# Patient Record
Sex: MALE | Race: WHITE | NOT HISPANIC OR LATINO | Employment: OTHER | URBAN - METROPOLITAN AREA
[De-identification: names, ages, dates, MRNs, and addresses within clinical notes are randomized per-mention and may not be internally consistent; named-entity substitution may affect disease eponyms.]

---

## 2020-09-21 ENCOUNTER — HOSPITAL ENCOUNTER (EMERGENCY)
Facility: HOSPITAL | Age: 35
Discharge: HOME | End: 2020-09-21
Attending: EMERGENCY MEDICINE
Payer: MEDICAID

## 2020-09-21 VITALS
DIASTOLIC BLOOD PRESSURE: 85 MMHG | TEMPERATURE: 99 F | OXYGEN SATURATION: 99 % | RESPIRATION RATE: 18 BRPM | HEART RATE: 77 BPM | SYSTOLIC BLOOD PRESSURE: 127 MMHG

## 2020-09-21 DIAGNOSIS — R07.89 CHEST PAIN, NON-CARDIAC: Primary | ICD-10-CM

## 2020-09-21 DIAGNOSIS — R51.9 HEADACHE IN FRONT OF HEAD: ICD-10-CM

## 2020-09-21 DIAGNOSIS — R07.9 CHEST PAIN: ICD-10-CM

## 2020-09-21 DIAGNOSIS — H81.399 PERIPHERAL VERTIGO, UNSPECIFIED LATERALITY: ICD-10-CM

## 2020-09-21 LAB
ALBUMIN SERPL BCP-MCNC: 4.1 G/DL (ref 3.5–5.2)
ALP SERPL-CCNC: 111 U/L (ref 55–135)
ALT SERPL W/O P-5'-P-CCNC: 20 U/L (ref 10–44)
ANION GAP SERPL CALC-SCNC: 7 MMOL/L (ref 8–16)
AST SERPL-CCNC: 14 U/L (ref 10–40)
BASOPHILS # BLD AUTO: 0.05 K/UL (ref 0–0.2)
BASOPHILS NFR BLD: 0.5 % (ref 0–1.9)
BILIRUB SERPL-MCNC: 0.4 MG/DL (ref 0.1–1)
BNP SERPL-MCNC: 11 PG/ML (ref 0–99)
BUN SERPL-MCNC: 12 MG/DL (ref 6–20)
CALCIUM SERPL-MCNC: 9.6 MG/DL (ref 8.7–10.5)
CHLORIDE SERPL-SCNC: 104 MMOL/L (ref 95–110)
CO2 SERPL-SCNC: 28 MMOL/L (ref 23–29)
CREAT SERPL-MCNC: 0.8 MG/DL (ref 0.5–1.4)
DIFFERENTIAL METHOD: ABNORMAL
EOSINOPHIL # BLD AUTO: 0.6 K/UL (ref 0–0.5)
EOSINOPHIL NFR BLD: 5.5 % (ref 0–8)
ERYTHROCYTE [DISTWIDTH] IN BLOOD BY AUTOMATED COUNT: 13.4 % (ref 11.5–14.5)
EST. GFR  (AFRICAN AMERICAN): >60 ML/MIN/1.73 M^2
EST. GFR  (NON AFRICAN AMERICAN): >60 ML/MIN/1.73 M^2
GLUCOSE SERPL-MCNC: 96 MG/DL (ref 70–110)
HCT VFR BLD AUTO: 47.2 % (ref 40–54)
HGB BLD-MCNC: 15.7 G/DL (ref 14–18)
IMM GRANULOCYTES # BLD AUTO: 0.04 K/UL (ref 0–0.04)
IMM GRANULOCYTES NFR BLD AUTO: 0.4 % (ref 0–0.5)
LYMPHOCYTES # BLD AUTO: 2.8 K/UL (ref 1–4.8)
LYMPHOCYTES NFR BLD: 25.4 % (ref 18–48)
MAGNESIUM SERPL-MCNC: 2 MG/DL (ref 1.6–2.6)
MCH RBC QN AUTO: 27.9 PG (ref 27–31)
MCHC RBC AUTO-ENTMCNC: 33.3 G/DL (ref 32–36)
MCV RBC AUTO: 84 FL (ref 82–98)
MONOCYTES # BLD AUTO: 0.8 K/UL (ref 0.3–1)
MONOCYTES NFR BLD: 6.9 % (ref 4–15)
NEUTROPHILS # BLD AUTO: 6.7 K/UL (ref 1.8–7.7)
NEUTROPHILS NFR BLD: 61.3 % (ref 38–73)
NRBC BLD-RTO: 0 /100 WBC
PLATELET # BLD AUTO: 233 K/UL (ref 150–350)
PMV BLD AUTO: 10.7 FL (ref 9.2–12.9)
POTASSIUM SERPL-SCNC: 4.4 MMOL/L (ref 3.5–5.1)
PROT SERPL-MCNC: 7.4 G/DL (ref 6–8.4)
RBC # BLD AUTO: 5.62 M/UL (ref 4.6–6.2)
SODIUM SERPL-SCNC: 139 MMOL/L (ref 136–145)
TROPONIN I SERPL DL<=0.01 NG/ML-MCNC: <0.006 NG/ML (ref 0–0.03)
WBC # BLD AUTO: 10.85 K/UL (ref 3.9–12.7)

## 2020-09-21 PROCEDURE — 83735 ASSAY OF MAGNESIUM: CPT

## 2020-09-21 PROCEDURE — 83880 ASSAY OF NATRIURETIC PEPTIDE: CPT

## 2020-09-21 PROCEDURE — 99285 EMERGENCY DEPT VISIT HI MDM: CPT | Mod: 25

## 2020-09-21 PROCEDURE — 80053 COMPREHEN METABOLIC PANEL: CPT

## 2020-09-21 PROCEDURE — 84484 ASSAY OF TROPONIN QUANT: CPT

## 2020-09-21 PROCEDURE — 93010 EKG 12-LEAD: ICD-10-PCS | Mod: ,,, | Performed by: INTERNAL MEDICINE

## 2020-09-21 PROCEDURE — 93010 ELECTROCARDIOGRAM REPORT: CPT | Mod: ,,, | Performed by: INTERNAL MEDICINE

## 2020-09-21 PROCEDURE — 93005 ELECTROCARDIOGRAM TRACING: CPT

## 2020-09-21 PROCEDURE — 85025 COMPLETE CBC W/AUTO DIFF WBC: CPT

## 2020-09-21 RX ORDER — BUTALBITAL, ACETAMINOPHEN AND CAFFEINE 50; 325; 40 MG/1; MG/1; MG/1
1 TABLET ORAL EVERY 4 HOURS PRN
Qty: 20 TABLET | Refills: 0 | Status: SHIPPED | OUTPATIENT
Start: 2020-09-21 | End: 2020-10-21

## 2020-09-21 RX ORDER — PREDNISONE 20 MG/1
40 TABLET ORAL DAILY
Qty: 10 TABLET | Refills: 0 | Status: SHIPPED | OUTPATIENT
Start: 2020-09-21 | End: 2020-09-26

## 2020-09-21 RX ORDER — MECLIZINE HYDROCHLORIDE 25 MG/1
25 TABLET ORAL EVERY 6 HOURS PRN
Qty: 20 TABLET | Refills: 0 | Status: SHIPPED | OUTPATIENT
Start: 2020-09-21

## 2020-09-21 NOTE — ED TRIAGE NOTES
Pt gives wife permission to speak on his behalf. Pt awake and alert with no acute resp distress. Here for eval of generalized weakness, chest pain, and sob x 2 mths. Primary provider diagnosed with high potassium, pain comes and goes with dizziness when trying to move around. Left sided chest pain onset last night with tingling in left side of chest. Feels like has a high temperature but its normal. Equal and strong hazel hand , no facial droop or slurred speech. Easy rr/nonlabored.

## 2020-09-21 NOTE — DISCHARGE INSTRUCTIONS
Please return immediately if you get worse or if new problems develop.  Please follow-up with your primary care doctor this week.  Rest.  Medicines as directed.  Continue your vitamins.  Regular meals; lots of sleep; lots of liquids.

## 2020-09-21 NOTE — ED PROVIDER NOTES
"Encounter Date: 9/21/2020    SCRIBE #1 NOTE: I, Sophie Graham, am scribing for, and in the presence of,  Jean Sprague MD. I have scribed the following portions of the note - Other sections scribed: HPI, VICTORIA.       History     Chief Complaint   Patient presents with    Chest Pain     Pt c/o intermittent LEFT-sided chest pain that radiates down L arm since July. Episodes come with dizziness and tingling to fingertips. Pt states PCP told him he has "high potassium". Pain is 8/10    Dizziness     This is a 35 y.o. male who presents to the ED complaining of left-sided chest pain that started 2 weeks ago. Patient's wife speaks on his behalf. She reports that the pain has progressively worsened and is worse with movement and palpation. She adds that he has pain with deep breathing but he is getting enough air. He denies having any pain on the right side of his chest. She states that he's had intermittent dizziness since July that is worse with movement. She notes that when he moves his head it feels like the room is spinning. She reports that he saw his PCP (Dr. Veronica Finley) for this and was told that he has "low/high potassium" which he is now on medication for. He denies having any hearing problems or tinnitus. She notes that he's had generalized weakness and a headache all over. She states that the headache has been intermittent for a couple months and he also gets blurry vision with it. She notes that he doesn't have a fever but he complains of feeling hot. She reports that he has intermittent right anterior knee pain and he denies having the pain currently. She denies a PMHx of DVT, PE, and heart problems. She denies any past surgeries. She notes tobacco use and denies alcohol use. Denies fever, chills, diaphoresis, ear pain, sore throat, cough, SOB, abdominal pain, vomiting, diarrhea, dysuria, rash, and arm problems. No other associated symptoms.    The history is provided by the spouse and the patient. No "  was used.     Review of patient's allergies indicates:  No Known Allergies  History reviewed. No pertinent past medical history.  History reviewed. No pertinent surgical history.  No family history on file.  Social History     Tobacco Use    Smoking status: Current Every Day Smoker     Packs/day: 2.00     Types: Cigarettes    Smokeless tobacco: Never Used   Substance Use Topics    Alcohol use: Never     Frequency: Never    Drug use: Never     Review of Systems   Constitutional: Negative for chills and fever.        (+) Feels hot   HENT: Negative for congestion, ear pain, hearing loss, rhinorrhea, sore throat and tinnitus.    Eyes: Positive for visual disturbance. Negative for pain.   Respiratory: Negative for cough and shortness of breath.    Cardiovascular: Positive for chest pain.   Gastrointestinal: Negative for abdominal pain, diarrhea, nausea and vomiting.   Genitourinary: Negative for dysuria.   Musculoskeletal: Positive for arthralgias. Negative for back pain and neck pain.   Skin: Negative for rash.   Neurological: Positive for dizziness, weakness and headaches.       Physical Exam     Initial Vitals [09/21/20 0904]   BP Pulse Resp Temp SpO2   124/84 85 18 98.5 °F (36.9 °C) 100 %      MAP       --         Physical Exam  The patient was examined specifically for the following:   General:No significant distress, Good color, Warm and dry. Head and neck:Scalp atraumatic, Neck supple. Neurological:Appropriate conversation, Gross motor deficits. Eyes:Conjugate gaze, Clear corneas. ENT: No epistaxis. Cardiac: Regular rate and rhythm, Grossly normal heart tones. Pulmonary: Wheezing, Rales. Gastrointestinal: Abdominal tenderness, Abdominal distention. Musculoskeletal: Extremity deformity, Apparent pain with range of motion of the joints. Skin: Rash.   The findings on examination were normal except for the following:  Patient has point tenderness in the left inframammary chest, reproducing the  pain of the chief complaint.  There is no clinical evidence of respiratory distress.  Lungs are clear.  The heart tones are normal.  The abdomen is soft.  There is no swelling or tenderness in the lower extremities.  Pain in the chest can be reproduced by posterior distraction of the left arm.  Tympanic membranes are clear.  Patient has a normal gait without any disequilibrium.  Mental status examination, cranial nerves, motor and sensory examination are normal, grossly.  ED Course   Procedures  Labs Reviewed   CBC W/ AUTO DIFFERENTIAL - Abnormal; Notable for the following components:       Result Value    Eos # 0.6 (*)     All other components within normal limits   COMPREHENSIVE METABOLIC PANEL - Abnormal; Notable for the following components:    Anion Gap 7 (*)     All other components within normal limits   B-TYPE NATRIURETIC PEPTIDE   MAGNESIUM   TROPONIN I     EKG Readings: (Independently Interpreted)   This patient is in a normal sinus rhythm with a heart rate of 82.  There is a sinus arrhythmia.  There are no significant ST segment or T-wave changes.  This patient may have an incomplete right bundle branch block.       Imaging Results          X-Ray Chest PA And Lateral (Final result)  Result time 09/21/20 10:14:21    Final result by Vaibhav Campuzano MD (09/21/20 10:14:21)                 Impression:      Normal chest.      Electronically signed by: Vaibhav Campuzano MD  Date:    09/21/2020  Time:    10:14             Narrative:    EXAMINATION:  XR CHEST PA AND LATERAL    CLINICAL HISTORY:  Chest pain, unspecified    TECHNIQUE:  PA and lateral views of the chest were performed.    COMPARISON:  None    FINDINGS:  Heart normal.  Lungs clear.                              Medical decision making:  Given the above, this presentation is not consistent with cardiac disease.  The pain is well localized that is sharper worse with deep breathing and movement and the chest is tender.  This is likely chest wall  pain syndrome.  I doubt pulmonary embolus.  There is no clinical evidence of respiratory distress.  The patient is perc negative.  He is very low risk.  Patient's evaluation is negative.  He has a negative troponin.  The neck is supple the neurologic examination is normal.  I doubt serious causes for this patient's headache.  His dizziness is worse with head movement it is subacute and comes and goes.  He has had a 4.  I will treat with meclizine.  I will treat the headache with Fioricet.  I will treat the chest pain with prednisone.  Chest x-ray is negative.  I will discharge him to outpatient evaluation and treatment.      Medical Decision Making:   Clinical Tests:   Lab Tests: Ordered and Reviewed  Radiological Study: Ordered and Reviewed  Medical Tests: Ordered and Reviewed    Additional MDM:   PERC Rule:   Age is greater than or equal to 50 = 0.0  Heart Rate is greater than or equal to 100 = 0.0  SaO2 on room air < 95% = 0.0  Unilateral leg swelling = 0.0  Hemoptysis = 0.0  Recent surgery or trauma = 0.0  Prior PE or DVT =  0.0  Hormone use = 0.00  PERC Score = 0         Scribe Attestation:   Scribe #1: I performed the above scribed service and the documentation accurately describes the services I performed. I attest to the accuracy of the note.                      Clinical Impression:       ICD-10-CM ICD-9-CM   1. Chest pain, non-cardiac  R07.89 786.59   2. Chest pain  R07.9 786.50   3. Headache in front of head  R51 784.0   4. Peripheral vertigo, unspecified laterality  H81.399 386.10                          ED Disposition Condition    Discharge Stable        ED Prescriptions     Medication Sig Dispense Start Date End Date Auth. Provider    predniSONE (DELTASONE) 20 MG tablet Take 2 tablets (40 mg total) by mouth once daily. for 5 days 10 tablet 9/21/2020 9/26/2020 Jean Sprague MD    meclizine (ANTIVERT) 25 mg tablet Take 1 tablet (25 mg total) by mouth every 6 (six) hours as needed. 20 tablet 9/21/2020   Jean Sprague MD    butalbital-acetaminophen-caffeine -40 mg (FIORICET, ESGIC) -40 mg per tablet Take 1 tablet by mouth every 4 (four) hours as needed for Pain. 20 tablet 9/21/2020 10/21/2020 Jean Sprague MD        Follow-up Information     Follow up With Specialties Details Why Contact Info    [facility] In 3 days  [address] [facility] [place] [address]  [phone]                        I personally performed the services described in this documentation.  All medical record  entries made by the scribe are at my direction and in my presence.  Signed, Dr. Celestine Sprague MD  09/21/20 1098